# Patient Record
Sex: FEMALE | Race: WHITE | Employment: OTHER | URBAN - NONMETROPOLITAN AREA
[De-identification: names, ages, dates, MRNs, and addresses within clinical notes are randomized per-mention and may not be internally consistent; named-entity substitution may affect disease eponyms.]

---

## 2017-02-12 ENCOUNTER — HOSPITAL ENCOUNTER (EMERGENCY)
Facility: HOSPITAL | Age: 62
Discharge: HOME | End: 2017-02-12
Attending: EMERGENCY MEDICINE | Admitting: EMERGENCY MEDICINE
Payer: COMMERCIAL

## 2017-02-12 VITALS
TEMPERATURE: 98.3 F | SYSTOLIC BLOOD PRESSURE: 169 MMHG | DIASTOLIC BLOOD PRESSURE: 109 MMHG | OXYGEN SATURATION: 95 % | RESPIRATION RATE: 16 BRPM

## 2017-02-12 DIAGNOSIS — S00.83XA FOREHEAD CONTUSION, INITIAL ENCOUNTER: ICD-10-CM

## 2017-02-12 DIAGNOSIS — S00.83XA TRAUMATIC ECCHYMOSIS OF FACE, INITIAL ENCOUNTER: ICD-10-CM

## 2017-02-12 DIAGNOSIS — S13.9XXA SPRAIN OF NECK, INITIAL ENCOUNTER: ICD-10-CM
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
## 2017-02-12 DIAGNOSIS — S80.02XA CONTUSION OF LEFT KNEE, INITIAL ENCOUNTER: ICD-10-CM

## 2017-02-12 PROCEDURE — 70450 CT HEAD/BRAIN W/O DYE: CPT | Mod: TC

## 2017-02-12 PROCEDURE — 73562 X-RAY EXAM OF KNEE 3: CPT | Mod: TC,LT

## 2017-02-12 PROCEDURE — 99284 EMERGENCY DEPT VISIT MOD MDM: CPT | Performed by: EMERGENCY MEDICINE

## 2017-02-12 PROCEDURE — 99284 EMERGENCY DEPT VISIT MOD MDM: CPT | Mod: 25

## 2017-02-12 PROCEDURE — 72125 CT NECK SPINE W/O DYE: CPT | Mod: TC

## 2017-02-12 ASSESSMENT — ENCOUNTER SYMPTOMS
RESPIRATORY NEGATIVE: 1
HEADACHES: 1
GASTROINTESTINAL NEGATIVE: 1
EYE REDNESS: 1
FACIAL SWELLING: 1
LIGHT-HEADEDNESS: 1
CONSTITUTIONAL NEGATIVE: 1
CARDIOVASCULAR NEGATIVE: 1
BRUISES/BLEEDS EASILY: 1
NECK PAIN: 1
COLOR CHANGE: 0

## 2017-02-12 NOTE — ED AVS SNAPSHOT
[facility]    [address] [address]    Phone:  [phone]                                       Thalia Arreola   MRN: [id number]    Department:  [facility]   Date of Visit:  2/12/2017           Patient Information     Date Of Birth          1955        Your diagnoses for this visit were:     Fall, initial encounter     Forehead contusion, initial encounter     Traumatic ecchymosis of face, initial encounter     Sprain of neck, initial encounter     Contusion of left knee, initial encounter        You were seen by Yaya Hunter MD.      Follow-up Information     Follow up with Regla Crawford MD.    Specialty:  Family Practice    Why:  As needed    Contact information:    [facility]  [address] [place] [id number]  [phone]          Discharge Instructions         Bruises (Contusions)    A contusion is a bruise. A bruise happens when a blow to your body doesn't break the skin but does break blood vessels beneath the skin. Blood leaking from the broken vessels causes redness and swelling. As it heals, your bruise is likely to turn colors like purple, green, and yellow. This is normal. The bruise should fade in 2 or 3 weeks.  Factors that make you more likely to bruise  Almost everyone bruises now and then. Certain people do bruise more easily than others. You're more prone to bruising as you get older. That's because blood vessels become more fragile with age. You're also more likely to bruise if you have a clotting disorder such as hemophilia or take medications that reduce clotting, including aspirin.  When to go to the emergency room (ER)  Bruises almost always heal on their own without special treatment. But for some people, a bad bruise can be serious. Seek medical care if you:    Have a clotting disorder such as hemophilia.    Have cirrhosis or other serious liver disease.    Take blood-thinning medications such as warfarin  (Coumadin).  What to expect in the ER  A doctor will examine your bruise and ask about any health conditions you have. In some cases, you may have a test to check how well your blood clots. Other treatment will depend on your needs.  Follow-up care  Sometimes a bruise gets worse instead of better. It may become larger and more swollen. This can occur when your body walls off a small pool of blood under the skin (hematoma). In very rare cases, your doctor may need to drain excess blood from the area.  Tip:  Apply an ice pack or bag of frozen peas to a bruise (keep a thin cloth between the cold source and your skin). This can help reduce redness and swelling.     1078-2417 [employer]. [address] [place] [address], [place] [address]. All rights reserved. This information is not intended as a substitute for professional medical care. Always follow your healthcare professional's instructions.      Thalia,   Your fall was quite dramatic but fortunately did not occur from a significant height nor at a fast rate of speed.  That being said it caused quite a forehead contusion/hematoma that has now gravitated around both eyes.  Your CT scans showed no fracture or brain injury or associated bleeding and your cervical Spine showed no disruption of your fusion and staples suggesting a sprain.  The left knee and area around the patella shows edema and free joint fluid but no apparent fractures.  You won't be ready for family portraits for a few weeks but on the positive side you do not seem to have any concussion symptoms.  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  Enjoy the rest of the season but be careful.  Good luck!       Review of your medicines      Our records show that you are taking the medicines listed below. If these are incorrect, please call your family doctor or clinic.        Dose / Directions Last dose taken    ASPIRIN PO   Dose:  325 mg        Take  325 mg by mouth daily   Refills:  0        EFFEXOR PO   Dose:  75 mg        Take 75 mg by mouth 2 times daily   Refills:  0        NEURONTIN PO   Dose:  300 mg        Take 300 mg by mouth At Bedtime   Refills:  0        SYNTHROID PO   Dose:  100 mcg        Take 100 mcg by mouth daily   Refills:  0        TRAZODONE HCL PO   Dose:  150 mg        Take 150 mg by mouth At Bedtime   Refills:  0                Procedures and tests performed during your visit     CT Cervical Spine w/o Contrast    CT Head w/o Contrast    XR Knee Left 3 Views      Orders Needing Specimen Collection     None      Pending Results     Date and Time Order Name Status Description    2/12/2017 1729 XR Knee Left 3 Views In process     2/12/2017 1729 CT Cervical Spine w/o Contrast In process     2/12/2017 1729 CT Head w/o Contrast In process             Pending Culture Results     No orders found from 2/10/2017 to 2/13/2017.            Thank you for choosing [facility]       Thank you for choosing [facility] for your care. Our goal is always to provide you with excellent care. Hearing back from our patients is one way we can continue to improve our services. Please take a few minutes to complete the written survey that you may receive in the mail after you visit with us. Thank you!        [employer] Information     [employer] gives you secure access to your electronic health record. If you see a primary care provider, you can also send messages to your care team and make appointments. If you have questions, please call your primary care clinic.  If you do not have a primary care provider, please call [phone] and they will assist you.        Care EveryWhere ID     This is your Care EveryWhere ID. This could be used by other organizations to access your [facility] medical records  [id number]        After Visit Summary       This is your record. Keep this with you and show to your community pharmacist(s) and doctor(s) at your next visit.

## 2017-02-12 NOTE — ED NOTES
"Patient comes in with her  via private vehicle. Patient was getting the ice off her wheel wells of the car \"i leaned to far over and landed on my knees first then my head  \" landed concrete. No LOC , hematoma noted on forehead, now patient has bruising under both eyes (racoon eyes) Denies any headache, nausea or disturbance in balance.  Denies any visual problems.  "

## 2017-02-12 NOTE — ED AVS SNAPSHOT
[facility]    [address] [address]    Phone:  [phone]                                       Thalia Arreola   MRN: [id number]    Department:  [facility]   Date of Visit:  2/12/2017           After Visit Summary Signature Page     I have received my discharge instructions, and my questions have been answered. I have discussed any challenges I see with this plan with the nurse or doctor.    ..........................................................................................................................................  Patient/Patient Representative Signature      ..........................................................................................................................................  Patient Representative Print Name and Relationship to Patient    ..................................................               ................................................  Date                                            Time    ..........................................................................................................................................  Reviewed by Signature/Title    ...................................................              ..............................................  Date                                                            Time

## 2017-02-13 NOTE — DISCHARGE INSTRUCTIONS
Bruises (Contusions)    A contusion is a bruise. A bruise happens when a blow to your body doesn't break the skin but does break blood vessels beneath the skin. Blood leaking from the broken vessels causes redness and swelling. As it heals, your bruise is likely to turn colors like purple, green, and yellow. This is normal. The bruise should fade in 2 or 3 weeks.  Factors that make you more likely to bruise  Almost everyone bruises now and then. Certain people do bruise more easily than others. You're more prone to bruising as you get older. That's because blood vessels become more fragile with age. You're also more likely to bruise if you have a clotting disorder such as hemophilia or take medications that reduce clotting, including aspirin.  When to go to the emergency room (ER)  Bruises almost always heal on their own without special treatment. But for some people, a bad bruise can be serious. Seek medical care if you:    Have a clotting disorder such as hemophilia.    Have cirrhosis or other serious liver disease.    Take blood-thinning medications such as warfarin (Coumadin).  What to expect in the ER  A doctor will examine your bruise and ask about any health conditions you have. In some cases, you may have a test to check how well your blood clots. Other treatment will depend on your needs.  Follow-up care  Sometimes a bruise gets worse instead of better. It may become larger and more swollen. This can occur when your body walls off a small pool of blood under the skin (hematoma). In very rare cases, your doctor may need to drain excess blood from the area.  Tip:  Apply an ice pack or bag of frozen peas to a bruise (keep a thin cloth between the cold source and your skin). This can help reduce redness and swelling.     2766-7336 [employer]. [address] [place] [address], [place] [address]. All rights reserved. This information is not intended as a substitute for professional medical care. Always  follow your healthcare professional's instructions.      Thalia,   Your fall was quite dramatic but fortunately did not occur from a significant height nor at a fast rate of speed.  That being said it caused quite a forehead contusion/hematoma that has now gravitated around both eyes.  Your CT scans showed no fracture or brain injury or associated bleeding and your cervical Spine showed no disruption of your fusion and staples suggesting a sprain.  The left knee and area around the patella shows edema and free joint fluid but no apparent fractures.  You won't be ready for family portraits for a few weeks but on the positive side you do not seem to have any concussion symptoms.  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  Enjoy the rest of the season but be careful.  Good luck!

## 2017-02-13 NOTE — ED PROVIDER NOTES
History     Chief Complaint   Patient presents with     Fall     c/o fell on friday, notes hit head on concrete floor, bruising on forehead and below both eyes, bilateral knee bruising. no h/a or loss of consciousness     HPI  Thalia Arreola is a 61 year old female who had a short fall of less than 3 feet catching herself on her knees before pitching forward onto her forehead near her scalp line.  She developed a large prominent contusion which she iced but has now developed prominent raccoon type periorbital ecchymosis so was encouraged to come in by friends even though she has only mild frontal headache, neck ache, and left knee fullness.      I have reviewed the Medications, Allergies, Past Medical and Surgical History, and Social History in the Epic system.    Review of Systems   Constitutional: Negative.    HENT: Positive for facial swelling.    Eyes: Positive for redness.   Respiratory: Negative.    Cardiovascular: Negative.    Gastrointestinal: Negative.    Musculoskeletal: Positive for neck pain.   Skin: Negative for color change, pallor and rash.   Neurological: Positive for light-headedness and headaches.   Hematological: Bruises/bleeds easily.       Physical Exam   BP: 166/98  Heart Rate: 71  Temp: 97.8  F (36.6  C)  Resp: 16  SpO2: 98 %  Physical Exam   Constitutional: She is oriented to person, place, and time. She appears well-developed and well-nourished. No distress.   HENT:   Head: Normocephalic.   Right Ear: External ear normal.   Left Ear: External ear normal.   Nose: Nose normal.   Mouth/Throat: Oropharynx is clear and moist.   Bilateral periorbital ecchymoses.    Eyes: Conjunctivae and EOM are normal. Pupils are equal, round, and reactive to light.   Neck: Normal range of motion. Neck supple. No tracheal deviation present. No thyromegaly present.   Cardiovascular: Normal rate and regular rhythm.    Pulmonary/Chest: Effort normal and breath sounds normal. No respiratory distress. She has no  wheezes. She has no rales.   Abdominal: Soft.   Musculoskeletal: Normal range of motion. She exhibits no edema.   Left knee ecchymotic with ballottable patella with surrounding effusion   Neurological: She is alert and oriented to person, place, and time. She has normal reflexes. No cranial nerve deficit.   Skin: Skin is warm. She is not diaphoretic.     ED Course     ED Course     Procedures  Critical Care time:  none  Labs Ordered and Resulted from Time of ED Arrival Up to the Time of Departure from the ED - No data to display    Assessments & Plan (with Medical Decision Making)   Thalia has a fairly dramatic ecchymoses that has developed around her eyes having gravitated from a large forehead hematoma.  No facial bone tenderness and 1-2+ cervical muscle tightness and old fusion scar.  No lateralizing neuro findings.  CT of head was normal and neck showed postop fusion of C4 thru C6 but no acute abnormality.  Left knee showed small effusion with no sign of hemarthrosis or fx.  Fortuanately, she had no signs of concussion so this was relatively minor impact in a prominently noticeable location.  She was reassured and should do well.   I have reviewed the nursing notes.    I have reviewed the findings, diagnosis, plan and need for follow up with the patient.    New Prescriptions    No medications on file       Final diagnoses:   Fall, initial encounter   Forehead contusion, initial encounter   Traumatic ecchymosis of face, initial encounter   Sprain of neck, initial encounter   Contusion of left knee, initial encounter       2/12/2017   [facility]     Yaya Hunter MD  02/12/17 3993       Yaya Hunter MD  02/12/17 2191

## 2017-02-13 NOTE — ED NOTES
Discharge instructions reviewed with patient. Enocuraged to return with new or worsening symptoms. No questions or concerns.

## 2017-11-26 ENCOUNTER — HEALTH MAINTENANCE LETTER (OUTPATIENT)
Age: 62
End: 2017-11-26

## 2020-03-02 ENCOUNTER — HEALTH MAINTENANCE LETTER (OUTPATIENT)
Age: 65
End: 2020-03-02

## 2021-04-18 ENCOUNTER — HEALTH MAINTENANCE LETTER (OUTPATIENT)
Age: 66
End: 2021-04-18

## 2021-10-03 ENCOUNTER — HEALTH MAINTENANCE LETTER (OUTPATIENT)
Age: 66
End: 2021-10-03

## 2022-03-19 ENCOUNTER — HEALTH MAINTENANCE LETTER (OUTPATIENT)
Age: 67
End: 2022-03-19

## 2022-05-14 ENCOUNTER — HEALTH MAINTENANCE LETTER (OUTPATIENT)
Age: 67
End: 2022-05-14

## 2022-09-04 ENCOUNTER — HEALTH MAINTENANCE LETTER (OUTPATIENT)
Age: 67
End: 2022-09-04

## 2023-06-03 ENCOUNTER — HEALTH MAINTENANCE LETTER (OUTPATIENT)
Age: 68
End: 2023-06-03